# Patient Record
Sex: MALE | Employment: STUDENT | URBAN - METROPOLITAN AREA
[De-identification: names, ages, dates, MRNs, and addresses within clinical notes are randomized per-mention and may not be internally consistent; named-entity substitution may affect disease eponyms.]

---

## 2024-08-12 ENCOUNTER — PROCEDURE VISIT (OUTPATIENT)
Dept: PSYCHOLOGY | Facility: HOSPITAL | Age: 19
End: 2024-08-12

## 2024-08-12 DIAGNOSIS — S06.0X0A CONCUSSION WITH NO LOSS OF CONSCIOUSNESS, INITIAL ENCOUNTER: Primary | ICD-10-CM

## 2024-08-12 PROCEDURE — 96133 NRPSYC TST EVAL PHYS/QHP EA: CPT | Performed by: CLINICAL NEUROPSYCHOLOGIST

## 2024-08-12 PROCEDURE — 96138 PSYCL/NRPSYC TECH 1ST: CPT | Mod: AH | Performed by: CLINICAL NEUROPSYCHOLOGIST

## 2024-08-12 PROCEDURE — 96133 NRPSYC TST EVAL PHYS/QHP EA: CPT | Mod: AH | Performed by: CLINICAL NEUROPSYCHOLOGIST

## 2024-08-12 PROCEDURE — 96116 NUBHVL XM PHYS/QHP 1ST HR: CPT | Performed by: CLINICAL NEUROPSYCHOLOGIST

## 2024-08-12 PROCEDURE — 96116 NUBHVL XM PHYS/QHP 1ST HR: CPT | Mod: AH | Performed by: CLINICAL NEUROPSYCHOLOGIST

## 2024-08-12 PROCEDURE — 96139 PSYCL/NRPSYC TST TECH EA: CPT | Mod: AH | Performed by: CLINICAL NEUROPSYCHOLOGIST

## 2024-08-12 PROCEDURE — 96132 NRPSYC TST EVAL PHYS/QHP 1ST: CPT | Performed by: CLINICAL NEUROPSYCHOLOGIST

## 2024-08-12 PROCEDURE — 96132 NRPSYC TST EVAL PHYS/QHP 1ST: CPT | Mod: AH | Performed by: CLINICAL NEUROPSYCHOLOGIST

## 2024-08-12 PROCEDURE — 96138 PSYCL/NRPSYC TECH 1ST: CPT | Performed by: CLINICAL NEUROPSYCHOLOGIST

## 2024-08-12 PROCEDURE — 96139 PSYCL/NRPSYC TST TECH EA: CPT | Performed by: CLINICAL NEUROPSYCHOLOGIST

## 2024-11-06 PROBLEM — S06.0X0A CONCUSSION WITH NO LOSS OF CONSCIOUSNESS, INITIAL ENCOUNTER: Status: ACTIVE | Noted: 2024-11-06

## 2024-11-06 NOTE — PROGRESS NOTES
Neuropsychological Consultation    Name (, MRN): Orville MITCHELL (2005, [id number])  Exam Date:  2024  Referring:  Janina Weaver MD,  Orthopedics    REASON FOR EVALUATION:    Concussion    CURRENT COMPLAINTS AND HISTORY:  Mr. Mitchell is a 18-year-old right-handed male who sustained a concussion in a football scrimmage on 24 (3 ½ months ago) after leading with his head during a tackle.  He denied experiencing any loss of consciousness, retrograde amnesia, post-traumatic amnesia, or emesis.  All of the symptoms resolved by 5/3/24 (within 2 weeks); there has been no return/worsening of symptoms during physical exertion (running, lifting, conditioning), mental exertion (studying playbook), or electronic media screen use (including computer/video/phone screen use up to 1 hour at a time).  Neuropsychological evaluation was requested to rule out residual cognitive symptoms and to assist in decisions regarding management.    Past medical and psychiatric history is otherwise unremarkable.  There is no prior history of concussion, headache, sleep problems, or seizures, anxiety, depression or other mental health concerns.  Mr. Mitchell denied use of alcohol, tobacco or recreational drugs.      Family history is noteworthy for ADHD (mother), learning disability (father).  There is no known family history of mental health conditions, sleep problems, headache, seizures, or cerebrovascular disease.      With regard to educational history, Mr. Mitchell completed high school earning a 2.7 GPA and is currently starting his sophomore year at [employer] majoring in [employer] earning a 3.1 GPA.  He was diagnosed with a learning disability in reading (dyslexia) as a young child; there is no history of attention difficulties or repeated grades.  Math comes more easily, whereas anything with extensive reading demands requires more effort.    MEDICATIONS:  No prescription or over-the-counter medications,  analgesics, or sleep aids.    MENTAL STATUS, BEHAVIOR OBSERVATIONS, AND VALIDITY:      Mr. Mitchell was alert and oriented, with normal gait, fine motor control, speech, expressed thought content, and insight, and there were no apparent lapses in judgment.  Mood appeared euthymic, and affect was appropriate to content and context.  Mood was described as “grateful.”  Mr. Mitchell denied anhedonia, altered appetite, sleep disturbance, hallucinations, and suicidal/homicidal ideation; there were no apparent delusions.     Rapport was quickly established, and Mr. Mitchell appeared to give good effort on all tasks; scores on performance validity measures were within normal limits.  He tended to respond very quickly/impulsively, which sometimes resulted in errors that he immediately self-corrected.  This was taken into consideration during interpretation.  These results as interpreted are considered reliable and valid.      ASSESSMENT PROCEDURES:      Review of Baseline & Post-Concussion Scores on Computerized Cognitive Screening (ImPACT); Background Information Form; Previous Head Injury Questionnaire; Post-Concussive Scale-Revised (PCS-R); Neurobehavioral Interview with Mr. Mitchell; Examination of Task Engagement; Wechsler Test of Adult Reading (WTAR); Wechsler Adult Intelligence Scale, 4th Ed. (WAIS-IV) Matrix Reasoning, Working Memory Index (Digit Span, Letter-Number Sequencing) and Processing Speed Index (Coding, Symbol Search); Trail Making Test (TMT, Form 1); Burton Auditory-Verbal Learning Test (RAVLT, Form 1); Wechsler Memory Scale-4th Ed. (WMS-IV) Logical Memory; Brief Visuospatial Memory Test, Revised (BVMT-R, Form 1); Hagen Anxiety Inventory (TANVIR); Hagen Depression Inventory, 2nd Ed. (BDI-II); Interactive Face-to-Face Feedback (Impressions, Recommendations/Management Plan, Patient Education); Coordination of care with other health care providers involved in the care plan; Integration, interpretation, and preparation of  final report.    TEST RESULTS:      On past computerized screening (ImPACT), Mr. Mitchell's 8/5/23 baseline was deemed to be an underestimate of his true abilities, attributable to the adverse impact of dyslexia.  Repeat ImPACT testing was not administered.    On a subjective symptom rating scale (PCS-R), Mr. Mitchell endorsed 0 post-concussive symptoms (on a scale of 0=None to 6=Severe) for a total score of 0 with no return/increase of symptoms during testing.    Pre-injury general cognitive ability was estimated to have been at least in the low average range or higher for verbal abilities and for visual-spatial abilities based on a test of word reading/processing (WTAR Reading Standard Score, borderline/low average range) and a test of visual-spatial reasoning (WAIS-IV Matrix Reasoning, low average range), both of which tend to be unaffected by concussion.  Educational attainment and achievement were also considered when estimating pre-injury levels of ability.      Mr. Mitchell scored at or above expected levels on measures of attention/concentration on measures that are very dependent on visualization of letters and numbers, which can be affected by dyslexia (WAIS-IV Digit Span and Letter-Number Sequencing, low average range); he demonstrated much higher concentration capacity on a measure of whole-word span (RAVLT List B, high average range).      Processing speed was in the borderline/low average range for most measures, which is regarded to be an underestimate of mental processing speed due to the extent that these speed tests rely on rapid number/letter/symbol decoding, which is affected by dyslexia (WAIS-IV Coding, borderline range; Symbol Search and TMT-A, low average range).    On tests of auditory-verbal learning/memory that can be adversely affected by dyslexia, Mr. Mitchell scored at levels consistent with his reading ability (RAVLT Total of Trials 1-5, borderline range); in spite of the adverse impact on  learning trials, he managed to score in the low average range on subsequent free recall trials (Trial 6/Short-Delay Free Recall and Trial 7/Long-Delay Free Recall) and improved further with a multiple-choice format (Delayed Recognition, mid-average range).  Memory for orally presented stories was also in the range predicted by his reading limitations (WMS-IV Logical Memory I and II, borderline/low average range).    Visual-spatial learning/memory, which is typically spared in dyslexia, was uniformly in the mid-average range (BVMT-R Total of Trials 1-3 and BVMT-R Trial 4/Long-Delay Free Recall).        On self-report screening of emotional functioning, depression ratings (BDI-II) and anxiety ratings (TANVIR) were  within normal limits.    CONCLUSIONS AND DIAGNOSTIC IMPRESSIONS:    Mr. Mitchell is a 18-year-old right-handed male who sustained a concussion on 4/27/24 (3 ½ months ago) with no loss of consciousness, retrograde amnesia, post-traumatic amnesia, or emesis.  All of the symptoms resolved within 2 weeks; there has been no return/worsening of symptoms during physical exertion, mental exertion, or electronic media screen use.  On formal neuropsychological testing in this exam, Mr. Mitchell performed at expected levels based on estimates of pre-injury functioning in the domains of attention/concentration, processing speed, auditory-verbal memory, and visual-spatial memory; anxiety and depression screenings were within normal limits.    These findings are consistent with diagnoses of concussion with no loss of consciousness (ICD S06.0X0A), which appears to have fully resolved).    RECOMMENDATIONS:    There is no compelling evidence in this profile that would warrant concern, from the neuropsychological standpoint.  No further neuropsychological evaluation is required before Mr. Mitchell resumes activity.    Mr. Mitchell should continue the graduated return-to-play progression protocol with close monitoring for return of  symptoms by Dr. Weaver and the Sports Medicine staff.  If symptoms return with physical exertion, Mr. Mitchell should discontinue the activity and contact the  or team physician promptly for further guidance.    If Mr. Mitchell sustains a concussion in the future, the present testing will serve as a baseline for comparison to assist with management.      These results, impressions, and recommendations were reviewed and discussed in detail with Mr. Mitchell at the conclusion of the evaluation.  I provided patient education regarding concussion, second-impact syndrome, risk factors associated with repeat concussion and with longer recovery, long-term prognosis of well-managed uncomplicated concussions, and evidence-based principles for management and recovery.  I answered all questions to his satisfaction.      These results and recommendations were relayed to Luciano Sebastian ATC immediately following the exam to guide the management plan and discussions with Mr. Mitchell.    Thank you for the opportunity to participate in Mr. Mitchell's evaluation and care.  If I can provide any additional assistance, please do not hesitate to contact me at [phone].    Sincerely,        Rik Hairston, PhD  Senior Attending Neuropsychologist, [facility]  Former Director of Clinical Neuropsychology, [facility] [place]  Professor, Dept. of Neurology, [employer]  Fellow of the National Academy of Neuropsychology  Fellow of the American Psychological Association  Fellow of the Sports Neuropsychology Society  Fellow of the American Epilepsy Society    ICD S06.0X0A  97980=3; 76666=9; 02559=9; 87103=4; 24856=9    Orig: Janina Weaver MD, Orthopaedic Surgery/Orthopaedic Sports Medicine, [facility]  cc: Luciano Sebastian MA, ATC; [facility] [place], [facility] (Fax [phone])  cc: Mr. Mitchell (electronically via   Emre)  cc: [facility] Electronic Medical Record

## 2025-01-22 ENCOUNTER — OFFICE VISIT (OUTPATIENT)
Dept: URGENT CARE | Age: 20
End: 2025-01-22

## 2025-01-22 VITALS
RESPIRATION RATE: 16 BRPM | WEIGHT: 175 LBS | OXYGEN SATURATION: 99 % | HEART RATE: 64 BPM | SYSTOLIC BLOOD PRESSURE: 116 MMHG | TEMPERATURE: 96.7 F | DIASTOLIC BLOOD PRESSURE: 65 MMHG

## 2025-01-22 DIAGNOSIS — L08.9 SKIN INFECTION: Primary | ICD-10-CM

## 2025-01-22 RX ORDER — DOXYCYCLINE 100 MG/1
100 CAPSULE ORAL 2 TIMES DAILY
Qty: 14 CAPSULE | Refills: 0 | Status: SHIPPED | OUTPATIENT
Start: 2025-01-22 | End: 2025-01-29

## 2025-01-22 ASSESSMENT — ENCOUNTER SYMPTOMS: WOUND: 1

## 2025-01-22 NOTE — PATIENT INSTRUCTIONS
Start Doxycycline as directed, wash with soap and water dial soap, can use Zyrtec over the counter for itching, follow up with dermatology if symptoms persist, go to school nurse for a recheck this week, go to emergency department if symptoms worsen any fever, chills, worsening redness , or swelling.

## 2025-01-22 NOTE — PROGRESS NOTES
Subjective   Patient ID: Orville Mitchell is a 19 y.o. male. They present today with a chief complaint of Rash (Right arm for 2 months).    History of Present Illness  HPI    Past Medical History  Allergies as of 01/22/2025    (No Known Allergies)       (Not in a hospital admission)       No past medical history on file.    No past surgical history on file.     reports that he has never smoked. He has never used smokeless tobacco. He reports that he does not drink alcohol and does not use drugs.    Review of Systems  Review of Systems   Skin:  Positive for rash and wound.                                  Objective    Vitals:    01/22/25 1246   BP: 116/65   BP Location: Left arm   Patient Position: Sitting   BP Cuff Size: Adult   Pulse: 64   Resp: 16   Temp: 35.9 °C (96.7 °F)   TempSrc: Temporal   SpO2: 99%   Weight: 79.4 kg (175 lb)     No LMP for male patient.    Physical Exam  Vitals reviewed.   Constitutional:       Appearance: Normal appearance.   HENT:      Head: Normocephalic and atraumatic.      Right Ear: Tympanic membrane, ear canal and external ear normal.      Left Ear: Tympanic membrane, ear canal and external ear normal.      Nose: Nose normal.      Mouth/Throat:      Mouth: Mucous membranes are moist.      Pharynx: Oropharynx is clear.   Eyes:      Extraocular Movements: Extraocular movements intact.      Conjunctiva/sclera: Conjunctivae normal.      Pupils: Pupils are equal, round, and reactive to light.   Cardiovascular:      Rate and Rhythm: Normal rate and regular rhythm.      Pulses: Normal pulses.      Heart sounds: Normal heart sounds.   Pulmonary:      Effort: Pulmonary effort is normal.      Breath sounds: Normal breath sounds.   Musculoskeletal:         General: Normal range of motion.      Cervical back: Normal range of motion.   Skin:     General: Skin is warm.      Capillary Refill: Capillary refill takes less than 2 seconds.      Findings: Erythema and lesion present.   Neurological:       General: No focal deficit present.      Mental Status: He is alert and oriented to person, place, and time.   Psychiatric:         Mood and Affect: Mood normal.         Behavior: Behavior normal.         Procedures    Point of Care Test & Imaging Results from this visit  No results found for this visit on 01/22/25.   No results found.    Diagnostic study results (if any) were reviewed by JASMIN Arthur.    Assessment/Plan   Allergies, medications, history, and pertinent labs/EKGs/Imaging reviewed by JASMIN Arthur.     Medical Decision Making  27-year-old male presents for rash/wound on right inner elbow.  Patient reports symptoms have been there for over a month he noticed some little bumps and he scratched it and now looks to be infected.  On exam there is 2 erythemic wounds anterior elbow with mild erythema surrounding draining clear liquid.  Mild rash next to wound.  Localized erythema.  It is not warm to touch.  Patient denies fever chills patient not sure of any allergens.  Patient denies shortness of breath chest pain difficulty swallowing or breathing.  Patient is wondering if it is eczema that he scratched and became infected we will treat with doxycycline as directed for skin infection.  Wash with soap and water preferably Dial soap monitor symptoms if any increased erythema any fever chills worsening wound present to the emergency department for further management follow-up with dermatology if symptoms persist patient reports his shots are up-to-date patient can take Zyrtec over-the-counter for itching patient agrees with plan of care patient left in stable condition    Orders and Diagnoses  There are no diagnoses linked to this encounter.    Medical Admin Record      Patient disposition: Home    Electronically signed by JASMIN Arthur  12:49 PM